# Patient Record
Sex: FEMALE | Race: ASIAN | NOT HISPANIC OR LATINO
[De-identification: names, ages, dates, MRNs, and addresses within clinical notes are randomized per-mention and may not be internally consistent; named-entity substitution may affect disease eponyms.]

---

## 2022-11-08 PROBLEM — Z00.00 ENCOUNTER FOR PREVENTIVE HEALTH EXAMINATION: Status: ACTIVE | Noted: 2022-11-08

## 2022-11-10 ENCOUNTER — APPOINTMENT (OUTPATIENT)
Dept: GYNECOLOGIC ONCOLOGY | Facility: CLINIC | Age: 42
End: 2022-11-10

## 2022-11-10 ENCOUNTER — APPOINTMENT (OUTPATIENT)
Dept: GYNECOLOGIC ONCOLOGY | Facility: CLINIC | Age: 42
End: 2022-11-10
Payer: MEDICAID

## 2022-11-10 ENCOUNTER — NON-APPOINTMENT (OUTPATIENT)
Age: 42
End: 2022-11-10

## 2022-11-10 VITALS
HEART RATE: 88 BPM | WEIGHT: 118 LBS | TEMPERATURE: 98.6 F | OXYGEN SATURATION: 100 % | DIASTOLIC BLOOD PRESSURE: 79 MMHG | BODY MASS INDEX: 21.71 KG/M2 | HEIGHT: 62 IN | SYSTOLIC BLOOD PRESSURE: 118 MMHG

## 2022-11-10 DIAGNOSIS — B37.31 ACUTE CANDIDIASIS OF VULVA AND VAGINA: ICD-10-CM

## 2022-11-10 DIAGNOSIS — R87.612 LOW GRADE SQUAMOUS INTRAEPITHELIAL LESION ON CYTOLOGIC SMEAR OF CERVIX (LGSIL): ICD-10-CM

## 2022-11-10 PROCEDURE — 99204 OFFICE O/P NEW MOD 45 MIN: CPT

## 2022-11-10 NOTE — PHYSICAL EXAM
[Normal] : Anus and perineum: Normal sphincter tone, no masses, no prolapse. [de-identified] : white discharge c/w candida

## 2022-11-10 NOTE — CHIEF COMPLAINT
[FreeTextEntry1] : 43 y/o referred by Dr. 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 Pt has no acute complaints. Pt reports h.o of abnormal pap smears x a few years. She denies previous treatment for dysplasia. \par \par LMP: 2 weeks ago\par OBHX: C/S  x 1\par GYNHX: as above\par \par PMHX:\par SX: 6/2021- benign breast tissue \par \par MED: denies\par ALL: NKDA\par \par SOCIAL: denies any toxic habits\par FAMHX:denies fmhx of cancer\par \par

## 2022-11-10 NOTE — HISTORY OF PRESENT ILLNESS
[FreeTextEntry1] : Problem List\par 1. ROSITA 1\par \par Previous Therapy\par 1. 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 S/P Colpo 9/2022\par     a) ECC- benign\par     b) cervix biopsy- ROSITA 1

## 2022-11-10 NOTE — ASSESSMENT
[FreeTextEntry1] : - Findings of ROSITA 1 discussed with patient and . 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 These recommendations were discussed with patient who is agreeable to plan.\par - Vaginitis panel\par - Diflucan tablets for candida vaginitis\par - Follow up with regular GYN or with me in 1 year for cotesting\par

## 2022-11-15 LAB
A VAGINAE DNA VAG QL NAA+PROBE: NORMAL
BVAB2 DNA VAG QL NAA+PROBE: NORMAL
C KRUSEI DNA VAG QL NAA+PROBE: NEGATIVE
C KRUSEI DNA VAG QL NAA+PROBE: POSITIVE
C TRACH RRNA SPEC QL NAA+PROBE: NEGATIVE
C TRACH RRNA SPEC QL NAA+PROBE: NOT DETECTED
MEGA1 DNA VAG QL NAA+PROBE: NORMAL
N GONORRHOEA RRNA SPEC QL NAA+PROBE: NEGATIVE
N GONORRHOEA RRNA SPEC QL NAA+PROBE: NOT DETECTED
SOURCE AMPLIFICATION: NORMAL
T VAGINALIS RRNA SPEC QL NAA+PROBE: NEGATIVE

## 2023-07-11 ENCOUNTER — APPOINTMENT (OUTPATIENT)
Dept: SURGICAL ONCOLOGY | Facility: CLINIC | Age: 43
End: 2023-07-11
Payer: MEDICAID

## 2023-07-11 VITALS
DIASTOLIC BLOOD PRESSURE: 74 MMHG | BODY MASS INDEX: 21.53 KG/M2 | TEMPERATURE: 98.8 F | WEIGHT: 117 LBS | SYSTOLIC BLOOD PRESSURE: 122 MMHG | HEIGHT: 62 IN | OXYGEN SATURATION: 98 % | HEART RATE: 82 BPM

## 2023-07-11 DIAGNOSIS — K82.4 CHOLESTEROLOSIS OF GALLBLADDER: ICD-10-CM

## 2023-07-11 DIAGNOSIS — Z86.018 PERSONAL HISTORY OF OTHER BENIGN NEOPLASM: ICD-10-CM

## 2023-07-11 DIAGNOSIS — Z78.9 OTHER SPECIFIED HEALTH STATUS: ICD-10-CM

## 2023-07-11 PROCEDURE — 99203 OFFICE O/P NEW LOW 30 MIN: CPT

## 2023-07-11 RX ORDER — FLUCONAZOLE 150 MG/1
150 TABLET ORAL
Qty: 1 | Refills: 1 | Status: DISCONTINUED | COMMUNITY
Start: 2022-11-10 | End: 2023-07-11

## 2023-07-11 RX ORDER — ERGOCALCIFEROL 1.25 MG/1
CAPSULE ORAL
Refills: 0 | Status: ACTIVE | COMMUNITY

## 2023-07-17 NOTE — ASSESSMENT
[FreeTextEntry1] : I) GB polyps\par \par P) Reviewed findings w patient and family with . Has been followed for GB polyps for a number of years. The largest has fluctuated between 7 and 9 mm (there was one report of 10 mm but just a month later was read as 7 mm) In aggregate the polyps have not significantly changed over the 5 years. We discussed that most polyps are cholesterol and less commonly true adenomas. Options are to continue observation which in my opinion is very reasonable given stability over the last 5 years, versus a cholecystectomy. At this point she favors observation. She knows to re contact us should there be a significant change on subsequent imaging. All questions answered.\par \par David Cleveland MD\par \par Chief Surgical Oncology\par Multidisciplinary GI cancer program\par [facility]\par [facility]\par \par Professor Surgery\par [employer]\par \par cc Dr Torres\par

## 2023-07-17 NOTE — HISTORY OF PRESENT ILLNESS
[de-identified] : Patient Name: GIORGI YARBROUGH \par MRN: [id number] \par [place] MRN:\par Referring Provider: KIM YARBROUGH MD \par Date: 07/11/2023 \par \par Diagnosis: GB Polyp\par \par 42 year female  presents for evaluation of cholelithiasis and gallbladder polyps.\par 8/3/2017 - Abdominal ultrasound showed polyps in the gallbladder measuring up to 0.7 cm.\par 2/6/2018 - Abd ultrasound revealed gallbladder polyps, largest measures 9 mm\par 10/5/18 - Abd ultrasound showed multiple non shadowing echogenic foci measuring up to 1.0 cm.\par 11/7/18 - RUQ ultrasound showed multiple gallbladder polyps measuring up to 0.7 cm.\par 10/29/19 - US Abdomen revealed multiple GB polyps, largest 8 mm\par 6/6/23 - Abd ultrasound showing cholelithiasis and persistent subcentimeter gallbladder polyps, now measuring 0.9 cm largest\par  \par She was referred here for a surgical consultation as the polyp seems to be fluctuating in size and has measured up to 1.0 cm. \par \par Currently, Ms. YARBROUGH denies abdominal pain or discomfort. She tolerates a regular diet and is having regular bowel movements.\par \par Functional Status: Ms. YARBROUGH is able to walk up 3 flights of stairs without fatigue or dyspnea.\par \par She [] genetic testing\par

## 2023-07-17 NOTE — REASON FOR VISIT
[Initial Consultation] : an initial consultation for [Family Member] : family member [Other: ______] : provided by SUPRIYA [FreeTextEntry2] : GB Polyp [Interpreters_IDNumber] : [id number] [Interpreters_FullName] : Kumar [FreeTextEntry3] : Surinamese [TWNoteComboBox1] : Other

## 2023-07-17 NOTE — PHYSICAL EXAM
[Normal Neck Lymph Nodes] : normal neck lymph nodes  [Normal Supraclavicular Lymph Nodes] : normal supraclavicular lymph nodes [Normal] : grossly intact [de-identified] : anicteric [de-identified] : S1,S2, regular rate and rhythm. No murmurs heard. [de-identified] : Clear throughout. No wheezes heard. [de-identified] : warm and dry

## 2023-07-17 NOTE — RESULTS/DATA
[FreeTextEntry1] : Date: 6/8/2023\par Study: Ultrasound Abdomen ([facility])\par Results: Mildly echogenic and heterogenous liver parenchyma which can be seen in the setting of hepatic steatosis and/or hepatic parenchymal disease\par Cholelithiasis\par Persistent subcentimeter gallbladder polyps, dominant polyp increased in size now measuring 0.9 cm. Continued imaging follow up is recommended\par Subcentimeter nonobstructing right renal calculus.

## 2023-11-09 ENCOUNTER — APPOINTMENT (OUTPATIENT)
Dept: GYNECOLOGIC ONCOLOGY | Facility: CLINIC | Age: 43
End: 2023-11-09